# Patient Record
Sex: FEMALE | Race: WHITE | NOT HISPANIC OR LATINO | URBAN - METROPOLITAN AREA
[De-identification: names, ages, dates, MRNs, and addresses within clinical notes are randomized per-mention and may not be internally consistent; named-entity substitution may affect disease eponyms.]

---

## 2022-03-25 DIAGNOSIS — Z86.010 PERSONAL HISTORY OF COLONIC POLYPS: ICD-10-CM

## 2022-03-25 DIAGNOSIS — Z12.11 SCREENING FOR COLON CANCER: Primary | ICD-10-CM

## 2022-05-30 DIAGNOSIS — R13.10 DYSPHAGIA: ICD-10-CM

## 2022-05-30 DIAGNOSIS — Z12.11 SCREENING FOR COLON CANCER: Primary | ICD-10-CM

## 2022-06-09 ENCOUNTER — TELEPHONE (OUTPATIENT)
Dept: GASTROENTEROLOGY | Facility: CLINIC | Age: 67
End: 2022-06-09
Payer: COMMERCIAL

## 2022-06-21 ENCOUNTER — TELEPHONE (OUTPATIENT)
Dept: GASTROENTEROLOGY | Facility: CLINIC | Age: 67
End: 2022-06-21
Payer: COMMERCIAL

## 2022-06-21 NOTE — TELEPHONE ENCOUNTER
----- Message from Gauri Madrid sent at 6/21/2022 11:15 AM CDT -----  Contact: pt  .Type:  Patient Returning Call    Who Called:Bernarda  Does the patient know what this is regarding?:results  Would the patient rather a call back or a response via [facility]? callback  Best Call Back Number:.[phone]    Additional Information:

## 2022-08-25 VITALS — HEIGHT: 69 IN | BODY MASS INDEX: 24.88 KG/M2 | WEIGHT: 168 LBS

## 2022-08-25 DIAGNOSIS — Z86.010 PERSONAL HISTORY OF COLONIC POLYPS: Primary | ICD-10-CM

## 2022-08-25 DIAGNOSIS — R13.10 DYSPHAGIA, UNSPECIFIED TYPE: ICD-10-CM

## 2022-08-25 DIAGNOSIS — Z12.11 SCREENING FOR COLON CANCER: ICD-10-CM

## 2022-08-25 RX ORDER — HYDROXYZINE HYDROCHLORIDE 25 MG/1
25 TABLET, FILM COATED ORAL DAILY
COMMUNITY
Start: 2022-06-16

## 2022-08-25 RX ORDER — GABAPENTIN 100 MG/1
100 CAPSULE ORAL NIGHTLY
COMMUNITY
Start: 2022-06-23

## 2022-08-25 RX ORDER — ESOMEPRAZOLE MAGNESIUM 40 MG/1
40 CAPSULE, DELAYED RELEASE ORAL DAILY
COMMUNITY
Start: 2022-08-06

## 2022-08-25 RX ORDER — SOTALOL HYDROCHLORIDE 80 MG/1
80 TABLET ORAL 2 TIMES DAILY
COMMUNITY
Start: 2022-05-31

## 2022-08-25 RX ORDER — SOD SULF/POT CHLORIDE/MAG SULF 1.479 G
12 TABLET ORAL DAILY
Qty: 24 TABLET | Refills: 0 | Status: SHIPPED | OUTPATIENT
Start: 2022-08-25 | End: 2022-11-01

## 2022-08-25 RX ORDER — LEVOTHYROXINE SODIUM 100 UG/1
100 TABLET ORAL DAILY
COMMUNITY
Start: 2022-05-31

## 2022-08-25 RX ORDER — MONTELUKAST SODIUM 10 MG/1
10 TABLET ORAL DAILY
COMMUNITY
Start: 2022-03-25

## 2022-08-25 NOTE — TELEPHONE ENCOUNTER
Reached out to patient and got her scheduled and chart updated. Patient does have a ref that was sent in from may for problem swallowing and wanted to know can she have both procedure be done at the same time if possible. Problem has been going on her about 3 mth she was prescribe Nexium 40 mg she take every night that Dr. Pruett sent out for her that she also states that she went to bed and felt that she couldn't swallow and it was hurting and docotr think is acid reflux, patient is still having the problem but noticed she needs to take small bite when eating no her food want come up. SUE

## 2022-08-25 NOTE — TELEPHONE ENCOUNTER
----- Message from Shakira Dempsey sent at 7/28/2022 10:29 AM CDT -----  Regarding: DR BARRAZA   Patient states she is calling back to schedule a colonoscopy, Dr. Barraza. Call back number [phone] (home)

## 2022-10-17 ENCOUNTER — TELEPHONE (OUTPATIENT)
Dept: GASTROENTEROLOGY | Facility: CLINIC | Age: 67
End: 2022-10-17
Payer: COMMERCIAL

## 2022-10-17 VITALS — HEIGHT: 69 IN | BODY MASS INDEX: 24.88 KG/M2 | WEIGHT: 168 LBS

## 2022-10-17 NOTE — TELEPHONE ENCOUNTER
"[facility]  [address] [place] [address]  Phone: [phone]  Fax: [phone]    History & Physical         Provider: Dr. Chris Barraza    Patient Name: Bernarda BORREGO (age):1955  67 y.o.           Gender: female   Phone: [phone]     Referring Physician: Laureano Pruett     Vital Signs:   Height - 5' 9"  Weight - 168 lbs   BMI -  24.8    Plan: EGD/Colonoscopy    Z86.010 - Personal history of colonic polyps  R13.10 - Dysphagia, unspecified type  Z12.11 - Screening for colon cancer       History:      Past Medical History:   Diagnosis Date    Acid reflux     Allergies     Bell's palsy     Hypothyroidism, unspecified     Irregular heart beat     Rheumatoid arthritis, unspecified       Past Surgical History:   Procedure Laterality Date    COLONOSCOPY      DENTAL SURGERY      HYSTERECTOMY      SKIN CANCER EXCISION        Medication List with Changes/Refills   Current Medications    ESOMEPRAZOLE (NEXIUM) 40 MG CAPSULE    Take 40 mg by mouth once daily. Acid reflux    EUTHYROX 100 MCG TABLET    Take 100 mcg by mouth once daily. thyroid    GABAPENTIN (NEURONTIN) 100 MG CAPSULE    Take 100 mg by mouth every evening. burrning in feet    HYDROXYZINE HCL (ATARAX) 25 MG TABLET    Take 25 mg by mouth once daily. Itchy    MONTELUKAST (SINGULAIR) 10 MG TABLET    Take 10 mg by mouth once daily. Allergies    SOD SULF-POT CHLORIDE-MAG SULF (SUTAB) 1.479-0.188- 0.225 GRAM TABLET    Take 12 tablets by mouth once daily. Take according to package instructions with indicated amount of water. No breakfast day before test. May substitute with Suprep, Clenpiq, Plenvu, Moviprep or GoLytely based on Rx plan and patient preference.    SOTALOL (BETAPACE) 80 MG TABLET    Take 80 mg by mouth 2 (two) times daily. Heart      Review of patient's allergies indicates:   Allergen Reactions    Sulfa (sulfonamide antibiotics)  "      Family History   Problem Relation Age of Onset    Thyroid disease Mother     Heart failure Mother     Cancer Father 85    Kidney failure Sister       Social History     Tobacco Use    Smoking status: Never    Smokeless tobacco: Never   Substance Use Topics    Alcohol use: Never    Drug use: Never        Physical Examination:     General Appearance:___________________________  HEENT: _____________________________________  Abdomen:____________________________________  Heart:________________________________________  Lungs:_______________________________________  Extremities:___________________________________  Skin:_________________________________________  Endocrine:____________________________________  Genitourinary:_________________________________  Neurological:__________________________________      Patient has been evaluated immediately prior to sedation and is medically cleared for endoscopy with IVCS as an ASA class: ______      Physician Signature: _________________________       Date: ________  Time: ________

## 2022-10-21 ENCOUNTER — OUTSIDE PLACE OF SERVICE (OUTPATIENT)
Dept: GASTROENTEROLOGY | Facility: CLINIC | Age: 67
End: 2022-10-21
Payer: COMMERCIAL

## 2022-10-21 LAB — CRC RECOMMENDATION EXT: NORMAL

## 2022-10-21 PROCEDURE — 43239 PR EGD, FLEX, W/BIOPSY, SGL/MULTI: ICD-10-PCS | Mod: 51,,, | Performed by: INTERNAL MEDICINE

## 2022-10-21 PROCEDURE — 45385 COLONOSCOPY W/LESION REMOVAL: CPT | Mod: 33,,, | Performed by: INTERNAL MEDICINE

## 2022-10-21 PROCEDURE — 43450 DILATE ESOPHAGUS 1/MULT PASS: CPT | Mod: 51,,, | Performed by: INTERNAL MEDICINE

## 2022-10-21 PROCEDURE — 43450 PR DILATE ESOPHAGUS: ICD-10-PCS | Mod: 51,,, | Performed by: INTERNAL MEDICINE

## 2022-10-21 PROCEDURE — 43239 EGD BIOPSY SINGLE/MULTIPLE: CPT | Mod: 51,,, | Performed by: INTERNAL MEDICINE

## 2022-10-21 PROCEDURE — 45385 PR COLONOSCOPY,REMV LESN,SNARE: ICD-10-PCS | Mod: 33,,, | Performed by: INTERNAL MEDICINE

## 2022-10-26 DIAGNOSIS — D12.6 HIGH GRADE DYSPLASIA IN COLONIC ADENOMA: Primary | ICD-10-CM

## 2022-10-26 DIAGNOSIS — Z86.010 HISTORY OF COLON POLYPS: ICD-10-CM

## 2022-10-26 NOTE — TELEPHONE ENCOUNTER
Received call from Ms. Helton w/ [facility] stating pt reported having an extremely sore throat and went to Urgent Care the day after procedure where she was diagnosed w/ the flu. Pt stated meds she was given wasn't helping and wanted other options from [facility].  Pt contacted [facility] again stating that she needs a work excuse from [facility] for 10/25-10/27. I told Ms. Helton @ [facility], we could give pt excuse for day of procedure but wouldn't likely give her one for the flu. I told her I would send message to [facility].

## 2022-10-27 ENCOUNTER — TELEPHONE (OUTPATIENT)
Dept: GASTROENTEROLOGY | Facility: CLINIC | Age: 67
End: 2022-10-27
Payer: COMMERCIAL

## 2022-10-27 NOTE — TELEPHONE ENCOUNTER
ZAM with information to ptNadeen Hollins note:  Bx's of esophagus show reflux-continue PPI qD.Polyp  shows High Grade Dysplasia (HGD)-will need repeat colon in 3 months due to this finding and ensure complete removal of these polyps.

## 2022-10-31 RX ORDER — POLYETHYLENE GLYCOL 3350, SODIUM SULFATE ANHYDROUS, SODIUM BICARBONATE, SODIUM CHLORIDE, POTASSIUM CHLORIDE 236; 22.74; 6.74; 5.86; 2.97 G/4L; G/4L; G/4L; G/4L; G/4L
4 POWDER, FOR SOLUTION ORAL ONCE
Qty: 4000 ML | Refills: 0 | Status: CANCELLED | OUTPATIENT
Start: 2022-10-31 | End: 2022-10-31

## 2022-10-31 NOTE — TELEPHONE ENCOUNTER
Reached out to patient and got chart updated and scheduled for 3 mnth repeat colonoscopy @ [facility] per [facility]. Patient stated she doesn't want Sutab again and she voiced understood instructions. No history or walking problem,heart stent or cpap. SUE

## 2022-11-01 RX ORDER — SODIUM, POTASSIUM,MAG SULFATES 17.5-3.13G
1 SOLUTION, RECONSTITUTED, ORAL ORAL ONCE
Qty: 1 KIT | Refills: 0 | Status: SHIPPED | OUTPATIENT
Start: 2022-11-01 | End: 2022-11-01

## 2022-11-08 NOTE — TELEPHONE ENCOUNTER
[facility]  [address] [place] [address]  Phone: [phone]  Fax: [phone]    History & Physical         Provider: Dr. Chris Barraza    Patient Name: Bernarda BORREGO (age):1955  67 y.o.           Gender: female   Phone: [phone]     Referring Physician: Laureano Pruett     Vital Signs:   Height - 5'9  Weight - 168 lb   BMI -  24.81    Plan: Colonoscopy @ [facility]     Encounter Diagnoses   Name Primary?    High grade dysplasia in colonic adenoma Yes    History of colon polyps            History:      Past Medical History:   Diagnosis Date    Acid reflux     Allergies     Bell's palsy     BMI 24.0-24.9, adult     Hypothyroidism, unspecified     Irregular heart beat     Rheumatoid arthritis, unspecified       Past Surgical History:   Procedure Laterality Date    COLONOSCOPY  10/21/2022    polyps    COLONOSCOPY  2015    polyps    DENTAL SURGERY      ESOPHAGOGASTRODUODENOSCOPY  10/21/2022    biopsy    HYSTERECTOMY      total    SKIN CANCER EXCISION        Medication List with Changes/Refills   New Medications    SODIUM,POTASSIUM,MAG SULFATES (SUPREP BOWEL PREP KIT) 17.5-3.13-1.6 GRAM SOLR    Take 177 mLs by mouth once. Take according to kit instructions but DO NOT eat breakfast the morning of procedure. for 1 dose   Current Medications    ESOMEPRAZOLE (NEXIUM) 40 MG CAPSULE    Take 40 mg by mouth once daily. Acid reflux    EUTHYROX 100 MCG TABLET    Take 100 mcg by mouth once daily. thyroid    GABAPENTIN (NEURONTIN) 100 MG CAPSULE    Take 100 mg by mouth every evening. burrning in feet    HYDROXYZINE HCL (ATARAX) 25 MG TABLET    Take 25 mg by mouth once daily. Itchy    MONTELUKAST (SINGULAIR) 10 MG TABLET    Take 10 mg by mouth once daily. Allergies    SOTALOL (BETAPACE) 80 MG TABLET    Take 80 mg by mouth 2 (two) times daily. Heart   Discontinued Medications    SOD SULF-POT CHLORIDE-MAG  SULF (SUTAB) 1.479-0.188- 0.225 GRAM TABLET    Take 12 tablets by mouth once daily. Take according to package instructions with indicated amount of water. No breakfast day before test. May substitute with Suprep, Clenpiq, Plenvu, Moviprep or GoLytely based on Rx plan and patient preference.      Review of patient's allergies indicates:   Allergen Reactions    Sulfa (sulfonamide antibiotics)       Family History   Problem Relation Age of Onset    Thyroid disease Mother     Heart failure Mother     Cancer Father 85    Kidney failure Sister       Social History     Tobacco Use    Smoking status: Never    Smokeless tobacco: Never   Substance Use Topics    Alcohol use: Never    Drug use: Never        Physical Examination:     General Appearance:___________________________  HEENT: _____________________________________  Abdomen:____________________________________  Heart:________________________________________  Lungs:_______________________________________  Extremities:___________________________________  Skin:_________________________________________  Endocrine:____________________________________  Genitourinary:_________________________________  Neurological:__________________________________      Patient has been evaluated immediately prior to sedation and is medically cleared for endoscopy with IVCS as an ASA class: ______      Physician Signature: _________________________       Date: ________  Time: ________

## 2023-01-06 ENCOUNTER — TELEPHONE (OUTPATIENT)
Dept: ADMINISTRATIVE | Facility: CLINIC | Age: 68
End: 2023-01-06
Payer: COMMERCIAL

## 2023-01-09 ENCOUNTER — TELEPHONE (OUTPATIENT)
Dept: GASTROENTEROLOGY | Facility: CLINIC | Age: 68
End: 2023-01-09
Payer: COMMERCIAL

## 2023-01-09 NOTE — TELEPHONE ENCOUNTER
Reached out to patient and she doesn't want to r/s due to deducible  not meet and she was ordered for a 3 mth repeat.

## 2024-12-16 ENCOUNTER — OFFICE VISIT (OUTPATIENT)
Dept: OBSTETRICS AND GYNECOLOGY | Facility: CLINIC | Age: 69
End: 2024-12-16
Payer: MEDICARE

## 2024-12-16 VITALS
BODY MASS INDEX: 26.88 KG/M2 | SYSTOLIC BLOOD PRESSURE: 134 MMHG | WEIGHT: 182 LBS | DIASTOLIC BLOOD PRESSURE: 84 MMHG | HEART RATE: 86 BPM

## 2024-12-16 DIAGNOSIS — L28.0 LSC (LICHEN SIMPLEX CHRONICUS): Primary | ICD-10-CM

## 2024-12-16 PROCEDURE — 99203 OFFICE O/P NEW LOW 30 MIN: CPT | Mod: S$PBB,,, | Performed by: OBSTETRICS & GYNECOLOGY

## 2024-12-16 RX ORDER — CLOBETASOL PROPIONATE 0.5 MG/G
CREAM TOPICAL 2 TIMES DAILY
Qty: 60 G | Refills: 3 | Status: SHIPPED | OUTPATIENT
Start: 2024-12-16

## 2024-12-16 RX ORDER — FLUCONAZOLE 150 MG/1
150 TABLET ORAL DAILY
Qty: 7 TABLET | Refills: 0 | Status: SHIPPED | OUTPATIENT
Start: 2024-12-16 | End: 2024-12-23

## 2024-12-16 RX ORDER — LEVOTHYROXINE SODIUM 125 UG/1
125 TABLET ORAL
COMMUNITY
Start: 2024-09-16

## 2024-12-16 NOTE — PROGRESS NOTES
Subjective:       Patient ID: Bernarda Drake is a 69 y.o. female.    Chief Complaint:  Vaginal Pain      History of Present Illness  Pt here for vulvar pain and itching.  History and past labs reviewed with patient.    Complaints of worsening Itching and pain.  States she has had this in the past was treated with a steroid cream but it never really went away.  Over the last few months patient has been taking care of her mother who has dementia and has noticed a worsening of her itching.  Has been self medicating with medicated powder and Benadryl.  Patient also started noticing a vaginal discharge recently.      Review of Systems  Review of Systems   Constitutional:  Negative for chills and fever.   Respiratory:  Negative for shortness of breath.    Cardiovascular:  Negative for chest pain.   Gastrointestinal:  Negative for abdominal pain, blood in stool, constipation, diarrhea, nausea, vomiting and reflux.   Genitourinary:  Positive for vaginal pain. Negative for dysmenorrhea, dyspareunia, dysuria, hematuria, hot flashes, menorrhagia, menstrual problem, pelvic pain, vaginal bleeding, vaginal discharge, postcoital bleeding and vaginal dryness.   Musculoskeletal:  Negative for arthralgias and joint swelling.   Integumentary:  Negative for rash, hair changes, breast mass, nipple discharge and breast skin changes.   Psychiatric/Behavioral:  Negative for depression. The patient is not nervous/anxious.    Breast: Negative for asymmetry, lump, mass, nipple discharge and skin changes          Objective:     Vitals:    12/16/24 1447   BP: 134/84   Pulse: 86   Weight: 82.6 kg (182 lb)      Female chaperone present   Physical Exam:   Constitutional: She appears well-developed and well-nourished. No distress.    HENT:   Head: Normocephalic and atraumatic.    Eyes: Conjunctivae and EOM are normal.    Neck: No tracheal deviation present. No thyromegaly present.    Cardiovascular:       Exam reveals no clubbing, no cyanosis  and no edema.        Pulmonary/Chest: Effort normal. No respiratory distress.        Abdominal: Soft. She exhibits no distension and no mass. There is no abdominal tenderness. There is no rebound and no guarding. No hernia.     Genitourinary:    Vagina, uterus and rectum normal.            Pelvic exam was performed with patient supine.   The external female genitalia was abnormal.   There is rash on the right labia. There is no tenderness, lesion or injury on the right labia. There is rash on the left labia. There is no tenderness, lesion or injury on the left labia. Cervix is normal. Right adnexum displays no mass, no tenderness and no fullness. Left adnexum displays no mass, no tenderness and no fullness.    Genitourinary Comments: Diffuse erythematous rash with thickening of the scan and increased skin markings.  Areas of excoriation noted                  Skin: She is not diaphoretic. No cyanosis. Nails show no clubbing.          Assessment:        1. LSC (lichen simplex chronicus)                Plan:      Start clobetasol BID   Diflucan x 1 week   May continue medicated powder as well   Discussed the itch scratch cycle   RTC 1 month to discuss vaginal atrophy treatment as well as progress of LSC

## 2025-01-16 ENCOUNTER — OFFICE VISIT (OUTPATIENT)
Dept: OBSTETRICS AND GYNECOLOGY | Facility: CLINIC | Age: 70
End: 2025-01-16
Payer: MEDICARE

## 2025-01-16 VITALS
SYSTOLIC BLOOD PRESSURE: 125 MMHG | HEART RATE: 86 BPM | WEIGHT: 180 LBS | DIASTOLIC BLOOD PRESSURE: 82 MMHG | BODY MASS INDEX: 26.58 KG/M2

## 2025-01-16 DIAGNOSIS — L28.0 LSC (LICHEN SIMPLEX CHRONICUS): Primary | ICD-10-CM

## 2025-01-16 DIAGNOSIS — L28.0 LICHEN SIMPLEX CHRONICUS: ICD-10-CM

## 2025-01-16 PROBLEM — C43.61 MALIGNANT MELANOMA OF RIGHT UPPER LIMB, INCLUDING SHOULDER: Status: ACTIVE | Noted: 2020-09-17

## 2025-01-16 PROCEDURE — 3008F BODY MASS INDEX DOCD: CPT | Mod: CPTII,,, | Performed by: OBSTETRICS & GYNECOLOGY

## 2025-01-16 PROCEDURE — 3074F SYST BP LT 130 MM HG: CPT | Mod: CPTII,,, | Performed by: OBSTETRICS & GYNECOLOGY

## 2025-01-16 PROCEDURE — 99213 OFFICE O/P EST LOW 20 MIN: CPT | Mod: S$PBB,,, | Performed by: OBSTETRICS & GYNECOLOGY

## 2025-01-16 PROCEDURE — 3079F DIAST BP 80-89 MM HG: CPT | Mod: CPTII,,, | Performed by: OBSTETRICS & GYNECOLOGY

## 2025-01-16 PROCEDURE — 1159F MED LIST DOCD IN RCRD: CPT | Mod: CPTII,,, | Performed by: OBSTETRICS & GYNECOLOGY

## 2025-01-16 NOTE — PROGRESS NOTES
Subjective:       Patient ID: Bernarda Drake is a 69 y.o. female.    Chief Complaint:  Follow-up      History of Present Illness  Pt here for vulvar pain and itching.  History and past labs reviewed with patient.    Complaints none. Rash is much improved.       Review of Systems  Review of Systems   Constitutional:  Negative for chills and fever.   Respiratory:  Negative for shortness of breath.    Cardiovascular:  Negative for chest pain.   Gastrointestinal:  Negative for abdominal pain, blood in stool, constipation, diarrhea, nausea, vomiting and reflux.   Genitourinary:  Negative for dysmenorrhea, dyspareunia, dysuria, hematuria, hot flashes, menorrhagia, menstrual problem, pelvic pain, vaginal bleeding, vaginal discharge, vaginal pain, postcoital bleeding and vaginal dryness.   Musculoskeletal:  Negative for arthralgias and joint swelling.   Integumentary:  Negative for rash, hair changes, breast mass, nipple discharge and breast skin changes.   Psychiatric/Behavioral:  Negative for depression. The patient is not nervous/anxious.    Breast: Negative for asymmetry, lump, mass, nipple discharge and skin changes          Objective:     Vitals:    01/16/25 1029   BP: 125/82   Pulse: 86   Weight: 81.6 kg (180 lb)      Female chaperone present   Physical Exam:   Constitutional: She appears well-developed and well-nourished. No distress.    HENT:   Head: Normocephalic and atraumatic.    Eyes: Conjunctivae and EOM are normal.    Neck: No tracheal deviation present. No thyromegaly present.    Cardiovascular:       Exam reveals no clubbing, no cyanosis and no edema.        Pulmonary/Chest: Effort normal. No respiratory distress.        Abdominal: Soft. She exhibits no distension and no mass. There is no abdominal tenderness. There is no rebound and no guarding. No hernia.     Genitourinary:    Vagina, uterus and rectum normal.      Pelvic exam was performed with patient supine.   The external female genitalia was  abnormal.   There is no rash, tenderness, lesion or injury on the right labia. There is no rash, tenderness, lesion or injury on the left labia. Cervix is normal. Right adnexum displays no mass, no tenderness and no fullness. Left adnexum displays no mass, no tenderness and no fullness.    Genitourinary Comments: Improvement in rash thickening of the skin and increased skin markings.  Areas of excoriation noted                  Skin: She is not diaphoretic. No cyanosis. Nails show no clubbing.          Assessment:        1. LSC (lichen simplex chronicus)    2. Lichen simplex chronicus               Plan:      continue clobetasol BID PRN itching   RTC 1 year